# Patient Record
Sex: MALE | Race: WHITE | URBAN - METROPOLITAN AREA
[De-identification: names, ages, dates, MRNs, and addresses within clinical notes are randomized per-mention and may not be internally consistent; named-entity substitution may affect disease eponyms.]

---

## 2019-10-25 ENCOUNTER — OFFICE VISIT (OUTPATIENT)
Dept: NEUROLOGY | Facility: CLINIC | Age: 74
End: 2019-10-25
Payer: MEDICARE

## 2019-10-25 VITALS
DIASTOLIC BLOOD PRESSURE: 72 MMHG | HEIGHT: 71 IN | WEIGHT: 195 LBS | BODY MASS INDEX: 27.3 KG/M2 | HEART RATE: 68 BPM | RESPIRATION RATE: 17 BRPM | SYSTOLIC BLOOD PRESSURE: 122 MMHG

## 2019-10-25 DIAGNOSIS — R12 HEARTBURN: ICD-10-CM

## 2019-10-25 DIAGNOSIS — F41.9 ANXIETY: ICD-10-CM

## 2019-10-25 DIAGNOSIS — G47.00 INSOMNIA, UNSPECIFIED TYPE: ICD-10-CM

## 2019-10-25 DIAGNOSIS — M47.816 LUMBAR SPONDYLOSIS: Primary | ICD-10-CM

## 2019-10-25 PROBLEM — I25.111 ATHEROSCLEROSIS OF NATIVE CORONARY ARTERY WITH ANGINA PECTORIS WITH DOCUMENTED SPASM (HCC): Status: ACTIVE | Noted: 2019-10-25

## 2019-10-25 PROCEDURE — 99203 OFFICE O/P NEW LOW 30 MIN: CPT | Performed by: PHYSICAL MEDICINE & REHABILITATION

## 2019-10-25 RX ORDER — TESTOSTERONE 30 MG/1.5ML
1 SOLUTION TOPICAL DAILY
COMMUNITY
Start: 2018-12-06

## 2019-10-25 RX ORDER — FAMOTIDINE 10 MG
10 TABLET ORAL AS NEEDED
COMMUNITY

## 2019-10-25 RX ORDER — FLUTICASONE PROPIONATE 50 MCG
1 SPRAY, SUSPENSION (ML) NASAL DAILY PRN
COMMUNITY

## 2019-10-25 RX ORDER — GENTAMICIN SULFATE 3 MG/ML
1 SOLUTION/ DROPS OPHTHALMIC AS NEEDED
Refills: 0 | COMMUNITY
Start: 2019-08-20

## 2019-10-25 RX ORDER — MULTIVIT WITH MINERALS/LUTEIN
1000 TABLET ORAL DAILY
COMMUNITY

## 2019-10-25 RX ORDER — GUAIFENESIN/EPHEDRINE HCL 200-12.5MG
1 TABLET ORAL DAILY
COMMUNITY

## 2019-10-25 RX ORDER — ATORVASTATIN CALCIUM 80 MG/1
80 TABLET, FILM COATED ORAL DAILY
COMMUNITY
Start: 2019-05-28

## 2019-10-25 NOTE — PROGRESS NOTES
[address]  Progress Note    CHIEF COMPLAINT:  Patient presents with:  Low Back Pain: New pt presents for LBP that began 2.5 months ago after revisiting core exercises at the gym.  Bilateral LBP that radiated Current Outpatient Medications   Medication Sig Dispense Refill   • ASPIRIN 81 OR Take 1 tablet by mouth daily. • atorvastatin 80 MG Oral Tab Take 80 mg by mouth daily.      • Testosterone 30 MG/ACT Transdermal Solution Place 1 Application onto the sk Skin  Open Sores: denies  Nodules or Lumps: admits  Rash: denies   Neurological  Loss of Strength Since last Visit: denies  Tingling/Numbness: admits  Balance: admits   Psychiatric  Anxiety: admits  Depressed Mood: denies         All other systems review

## 2019-11-26 PROBLEM — G47.00 INSOMNIA: Status: ACTIVE | Noted: 2019-11-26

## 2019-11-26 PROBLEM — M47.816 LUMBAR SPONDYLOSIS: Status: ACTIVE | Noted: 2019-11-26

## 2019-11-26 PROBLEM — R12 HEARTBURN: Status: ACTIVE | Noted: 2019-11-26

## 2019-11-26 PROBLEM — F41.9 ANXIETY: Status: ACTIVE | Noted: 2019-11-26

## 2019-11-27 ENCOUNTER — MED REC SCAN ONLY (OUTPATIENT)
Dept: NEUROLOGY | Facility: CLINIC | Age: 74
End: 2019-11-27

## 2019-12-11 ENCOUNTER — MED REC SCAN ONLY (OUTPATIENT)
Dept: NEUROLOGY | Facility: CLINIC | Age: 74
End: 2019-12-11

## 2020-01-14 ENCOUNTER — MED REC SCAN ONLY (OUTPATIENT)
Dept: NEUROLOGY | Facility: CLINIC | Age: 75
End: 2020-01-14

## 2020-01-16 ENCOUNTER — MED REC SCAN ONLY (OUTPATIENT)
Dept: NEUROLOGY | Facility: CLINIC | Age: 75
End: 2020-01-16

## 2022-11-17 ENCOUNTER — TELEPHONE (OUTPATIENT)
Dept: CARDIAC REHAB | Age: 77
End: 2022-11-17

## (undated) NOTE — LETTER
[address] 38   Date:   10/25/2019     Name:   Chelsie Jara    YOB: 1945   MRN:   [id number]       WHERE IS YOUR PAIN NOW? Sav the areas on your body where you feel the described sensations.   Use the appropriate